# Patient Record
Sex: FEMALE | Race: BLACK OR AFRICAN AMERICAN | NOT HISPANIC OR LATINO
[De-identification: names, ages, dates, MRNs, and addresses within clinical notes are randomized per-mention and may not be internally consistent; named-entity substitution may affect disease eponyms.]

---

## 2022-07-28 ENCOUNTER — NON-APPOINTMENT (OUTPATIENT)
Age: 47
End: 2022-07-28

## 2022-07-28 ENCOUNTER — APPOINTMENT (OUTPATIENT)
Dept: HEART AND VASCULAR | Facility: CLINIC | Age: 47
End: 2022-07-28

## 2022-07-28 VITALS
HEIGHT: 63 IN | DIASTOLIC BLOOD PRESSURE: 104 MMHG | BODY MASS INDEX: 25.69 KG/M2 | HEART RATE: 74 BPM | OXYGEN SATURATION: 100 % | SYSTOLIC BLOOD PRESSURE: 161 MMHG | WEIGHT: 145 LBS

## 2022-07-28 PROCEDURE — 93000 ELECTROCARDIOGRAM COMPLETE: CPT

## 2022-07-28 PROCEDURE — 99204 OFFICE O/P NEW MOD 45 MIN: CPT | Mod: 25

## 2022-08-01 VITALS — DIASTOLIC BLOOD PRESSURE: 80 MMHG | HEART RATE: 68 BPM | RESPIRATION RATE: 16 BRPM | SYSTOLIC BLOOD PRESSURE: 130 MMHG

## 2022-08-01 RX ORDER — AMOXICILLIN 250 MG/1
250 CAPSULE ORAL
Refills: 0 | Status: ACTIVE | COMMUNITY

## 2022-08-01 NOTE — REASON FOR VISIT
[Structural Heart and Valve Disease] : structural heart and valve disease [FreeTextEntry1] : This 47 year old woman who was last seen by me in 2014 is referred by her internist (Dr. Carranza) for cardiac follow up.  She has a h/o severe mitral stenosis s/p MVR in 2014 with Dr. Ruff at [facility].  She has been doing very well since her surgery. She denies chest pain, SOB, palpitations or syncope. She takes Amoxicillin as prophylaxis when she has dental cleaning. She had an unremarkable stress test as well as echocardiogram in 2021.

## 2022-08-01 NOTE — DISCUSSION/SUMMARY
[FreeTextEntry1] : EKG: NSR, occ APC\par \par Impression: s/p MVR for severe MS. Will order echocardiogram to assess prosthetic valve.

## 2023-09-28 ENCOUNTER — APPOINTMENT (OUTPATIENT)
Dept: HEART AND VASCULAR | Facility: CLINIC | Age: 48
End: 2023-09-28
Payer: COMMERCIAL

## 2023-09-28 PROCEDURE — 99214 OFFICE O/P EST MOD 30 MIN: CPT

## 2023-10-02 VITALS — HEART RATE: 72 BPM | SYSTOLIC BLOOD PRESSURE: 130 MMHG | RESPIRATION RATE: 16 BRPM | DIASTOLIC BLOOD PRESSURE: 80 MMHG

## 2024-11-14 ENCOUNTER — APPOINTMENT (OUTPATIENT)
Dept: HEART AND VASCULAR | Facility: CLINIC | Age: 49
End: 2024-11-14
Payer: COMMERCIAL

## 2024-11-14 PROCEDURE — G2211 COMPLEX E/M VISIT ADD ON: CPT | Mod: NC

## 2024-11-14 PROCEDURE — 99214 OFFICE O/P EST MOD 30 MIN: CPT

## 2024-11-18 VITALS — SYSTOLIC BLOOD PRESSURE: 120 MMHG | DIASTOLIC BLOOD PRESSURE: 80 MMHG | RESPIRATION RATE: 16 BRPM | HEART RATE: 68 BPM

## 2025-01-16 ENCOUNTER — NON-APPOINTMENT (OUTPATIENT)
Age: 50
End: 2025-01-16